# Patient Record
Sex: FEMALE | Race: WHITE | URBAN - METROPOLITAN AREA
[De-identification: names, ages, dates, MRNs, and addresses within clinical notes are randomized per-mention and may not be internally consistent; named-entity substitution may affect disease eponyms.]

---
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
## 2025-06-16 ENCOUNTER — OFFICE VISIT (OUTPATIENT)
Age: 25
End: 2025-06-16
Payer: COMMERCIAL

## 2025-06-16 VITALS
HEIGHT: 64 IN | OXYGEN SATURATION: 98 % | WEIGHT: 142.8 LBS | TEMPERATURE: 97.7 F | RESPIRATION RATE: 14 BRPM | HEART RATE: 67 BPM | DIASTOLIC BLOOD PRESSURE: 70 MMHG | BODY MASS INDEX: 24.38 KG/M2 | SYSTOLIC BLOOD PRESSURE: 111 MMHG

## 2025-06-16 DIAGNOSIS — Z13.29 SCREENING FOR THYROID DISORDER: ICD-10-CM

## 2025-06-16 DIAGNOSIS — D50.8 OTHER IRON DEFICIENCY ANEMIA: ICD-10-CM

## 2025-06-16 DIAGNOSIS — I73.00 RAYNAUD'S PHENOMENON WITHOUT GANGRENE: ICD-10-CM

## 2025-06-16 DIAGNOSIS — Z13.1 SCREENING FOR DIABETES MELLITUS: ICD-10-CM

## 2025-06-16 DIAGNOSIS — Z00.00 ENCOUNTER FOR GENERAL ADULT MEDICAL EXAMINATION WITHOUT ABNORMAL FINDINGS: Primary | ICD-10-CM

## 2025-06-16 DIAGNOSIS — Z76.89 ENCOUNTER TO ESTABLISH CARE: ICD-10-CM

## 2025-06-16 DIAGNOSIS — E55.9 VITAMIN D DEFICIENCY: ICD-10-CM

## 2025-06-16 DIAGNOSIS — Z13.220 SCREENING CHOLESTEROL LEVEL: ICD-10-CM

## 2025-06-16 PROBLEM — G43.909 MIGRAINE: Status: ACTIVE | Noted: 2025-06-16

## 2025-06-16 PROCEDURE — 99385 PREV VISIT NEW AGE 18-39: CPT

## 2025-06-16 RX ORDER — ATOGEPANT 60 MG/1
TABLET ORAL
COMMUNITY
Start: 2024-07-08

## 2025-06-16 RX ORDER — NORETHINDRONE ACETATE AND ETHINYL ESTRADIOL 1MG-20(21)
1 KIT ORAL DAILY
COMMUNITY

## 2025-06-16 RX ORDER — RIMEGEPANT SULFATE 75 MG/75MG
TABLET, ORALLY DISINTEGRATING ORAL
COMMUNITY

## 2025-06-16 SDOH — ECONOMIC STABILITY: FOOD INSECURITY: WITHIN THE PAST 12 MONTHS, YOU WORRIED THAT YOUR FOOD WOULD RUN OUT BEFORE YOU GOT MONEY TO BUY MORE.: NEVER TRUE

## 2025-06-16 SDOH — ECONOMIC STABILITY: FOOD INSECURITY: WITHIN THE PAST 12 MONTHS, THE FOOD YOU BOUGHT JUST DIDN'T LAST AND YOU DIDN'T HAVE MONEY TO GET MORE.: NEVER TRUE

## 2025-06-16 ASSESSMENT — PATIENT HEALTH QUESTIONNAIRE - PHQ9
SUM OF ALL RESPONSES TO PHQ QUESTIONS 1-9: 0
1. LITTLE INTEREST OR PLEASURE IN DOING THINGS: NOT AT ALL
SUM OF ALL RESPONSES TO PHQ QUESTIONS 1-9: 0
2. FEELING DOWN, DEPRESSED OR HOPELESS: NOT AT ALL

## 2025-06-16 NOTE — PROGRESS NOTES
CC:  Chief Complaint   Patient presents with    New Patient     Establish care       HPI:  Corinna Mayorga is a 24 y.o. year old female who presents to the clinic as a new patient to establish care and for annual wellness visit.    She has ongoing chronic migraines, currently on Qulipta for preventative and Nurtec for abortive therapy. Followed by neurology.     No acute concerns.  She does report symptoms correlating with Raynaud's phenomenon.  Reports when it is cold, her feet will easily get cold with decreased color and sensation of pins-and-needles.  She mentioned this to her neurologist, who recommended she follow-up with primary care for further management of this.  Also mentions starting calcium channel blocker to manage her symptoms if needed.    Health Maintenance:  - pap, [facility]    Reviewed PmHx, RxHx, FmHx, SocHx, AllgHx and updated in chart.    ROS:  General:  Denies weight changes, fever, headache  Opthalmologic:  Denies blurred vision, changes in vision  ENT:  Denies difficulty swallowing, decreased hearing  Respiratory:  Denies shortness of breath, cough, coughing up blood, wheezing  Cardiovascular:  Denies chest pain, edema  Gastrointestinal:  Denies abdominal pain, blood in stool, changes in bowel habits, heartburn, nausea and vomiting  Genitourinary:  Denies difficulty urinating, painful urinating, blood in urine  Musculoskeletal:  Denies joint stiffness, muscle aches  Skin:  Denies rash, lesions. Admits easy bruising, discoloration  Neurological:  Denies difficulty speaking, dizziness, fainting  Psychiatric:  Denies anxiety, depression, suicidal thoughts, mood changes         Vitals:    06/16/25 1320   BP: 111/70   BP Site: Left Upper Arm   Patient Position: Sitting   Pulse: 67   Resp: 14   Temp: 97.7 °F (36.5 °C)   SpO2: 98%   Weight: 64.8 kg (142 lb 12.8 oz)   Height: 1.626 m (5' 4\")     PHYSICAL EXAM:  General:  Alert and oriented x 3. No acute distress  Head:  
Corinna Mayorga is a 24 y.o. female    Chief Complaint   Patient presents with    New Patient     Establish care     Vitals:    06/16/25 1320   BP: 111/70   BP Site: Left Upper Arm   Patient Position: Sitting   Pulse: 67   Resp: 14   Temp: 97.7 °F (36.5 °C)   SpO2: 98%   Weight: 64.8 kg (142 lb 12.8 oz)   Height: 1.626 m (5' 4\")         Health Maintenance Due   Topic Date Due    Depression Screen  Never done    Varicella vaccine (1 of 2 - 13+ 2-dose series) Never done    HPV vaccine (1 - 3-dose series) Never done    HIV screen  Never done    Meningococcal B vaccine (1 of 2 - Standard) Never done    Chlamydia/GC screen  Never done    Hepatitis C screen  Never done    Hepatitis B vaccine (1 of 3 - 19+ 3-dose series) Never done    DTaP/Tdap/Td vaccine (1 - Tdap) Never done    Pap smear  Never done    COVID-19 Vaccine (1 - 2024-25 season) Never done         \"Have you been to the ER, urgent care clinic since your last visit?  Hospitalized since your last visit?\"    NO    “Have you seen or consulted any other health care providers outside of [facility] since your last visit?”    NO        “Have you had a pap smear?”    NO    No cervical cancer screening on file          
intact

## 2025-06-17 LAB
25(OH)D3 SERPL-MCNC: 41.3 NG/ML (ref 30–100)
ALBUMIN SERPL-MCNC: 3.9 G/DL (ref 3.5–5)
ALBUMIN/GLOB SERPL: 1 (ref 1.1–2.2)
ALP SERPL-CCNC: 73 U/L (ref 45–117)
ALT SERPL-CCNC: 22 U/L (ref 12–78)
ANION GAP SERPL CALC-SCNC: 6 MMOL/L (ref 2–12)
AST SERPL-CCNC: 21 U/L (ref 15–37)
BASOPHILS # BLD: 0.04 K/UL (ref 0–0.1)
BASOPHILS NFR BLD: 0.6 % (ref 0–1)
BILIRUB SERPL-MCNC: 0.7 MG/DL (ref 0.2–1)
BUN SERPL-MCNC: 12 MG/DL (ref 6–20)
BUN/CREAT SERPL: 14 (ref 12–20)
CALCIUM SERPL-MCNC: 9.3 MG/DL (ref 8.5–10.1)
CHLORIDE SERPL-SCNC: 106 MMOL/L (ref 97–108)
CHOLEST SERPL-MCNC: 196 MG/DL
CO2 SERPL-SCNC: 27 MMOL/L (ref 21–32)
CREAT SERPL-MCNC: 0.88 MG/DL (ref 0.55–1.02)
DIFFERENTIAL METHOD BLD: NORMAL
EOSINOPHIL # BLD: 0.06 K/UL (ref 0–0.4)
EOSINOPHIL NFR BLD: 0.9 % (ref 0–7)
ERYTHROCYTE [DISTWIDTH] IN BLOOD BY AUTOMATED COUNT: 12.6 % (ref 11.5–14.5)
EST. AVERAGE GLUCOSE BLD GHB EST-MCNC: 97 MG/DL
FERRITIN SERPL-MCNC: 17 NG/ML (ref 26–388)
GLOBULIN SER CALC-MCNC: 3.9 G/DL (ref 2–4)
GLUCOSE SERPL-MCNC: 83 MG/DL (ref 65–100)
HBA1C MFR BLD: 5 % (ref 4–5.6)
HCT VFR BLD AUTO: 41 % (ref 35–47)
HDLC SERPL-MCNC: 73 MG/DL
HDLC SERPL: 2.7 (ref 0–5)
HGB BLD-MCNC: 13.2 G/DL (ref 11.5–16)
IMM GRANULOCYTES # BLD AUTO: 0.01 K/UL (ref 0–0.04)
IMM GRANULOCYTES NFR BLD AUTO: 0.1 % (ref 0–0.5)
IRON SATN MFR SERPL: 16 % (ref 20–50)
IRON SERPL-MCNC: 75 UG/DL (ref 35–150)
LDLC SERPL CALC-MCNC: 104.6 MG/DL (ref 0–100)
LYMPHOCYTES # BLD: 1.74 K/UL (ref 0.8–3.5)
LYMPHOCYTES NFR BLD: 25.1 % (ref 12–49)
MCH RBC QN AUTO: 31.3 PG (ref 26–34)
MCHC RBC AUTO-ENTMCNC: 32.2 G/DL (ref 30–36.5)
MCV RBC AUTO: 97.2 FL (ref 80–99)
MONOCYTES # BLD: 0.55 K/UL (ref 0–1)
MONOCYTES NFR BLD: 7.9 % (ref 5–13)
NEUTS SEG # BLD: 4.52 K/UL (ref 1.8–8)
NEUTS SEG NFR BLD: 65.4 % (ref 32–75)
NRBC # BLD: 0 K/UL (ref 0–0.01)
NRBC BLD-RTO: 0 PER 100 WBC
PLATELET # BLD AUTO: 210 K/UL (ref 150–400)
PMV BLD AUTO: 12.4 FL (ref 8.9–12.9)
POTASSIUM SERPL-SCNC: 4.3 MMOL/L (ref 3.5–5.1)
PROT SERPL-MCNC: 7.8 G/DL (ref 6.4–8.2)
RBC # BLD AUTO: 4.22 M/UL (ref 3.8–5.2)
SODIUM SERPL-SCNC: 139 MMOL/L (ref 136–145)
TIBC SERPL-MCNC: 467 UG/DL (ref 250–450)
TRIGL SERPL-MCNC: 92 MG/DL
TSH SERPL DL<=0.05 MIU/L-ACNC: 0.98 UIU/ML (ref 0.36–3.74)
VIT B12 SERPL-MCNC: 191 PG/ML (ref 193–986)
VLDLC SERPL CALC-MCNC: 18.4 MG/DL
WBC # BLD AUTO: 6.9 K/UL (ref 3.6–11)

## 2025-06-18 ENCOUNTER — RESULTS FOLLOW-UP (OUTPATIENT)
Age: 25
End: 2025-06-18

## 2025-09-05 ENCOUNTER — OFFICE VISIT (OUTPATIENT)
Age: 25
End: 2025-09-05

## 2025-09-05 VITALS
SYSTOLIC BLOOD PRESSURE: 114 MMHG | HEART RATE: 88 BPM | DIASTOLIC BLOOD PRESSURE: 77 MMHG | BODY MASS INDEX: 25.4 KG/M2 | OXYGEN SATURATION: 98 % | WEIGHT: 148 LBS

## 2025-09-05 DIAGNOSIS — J01.90 ACUTE NON-RECURRENT SINUSITIS, UNSPECIFIED LOCATION: Primary | ICD-10-CM

## 2025-09-05 RX ORDER — METHYLPREDNISOLONE 4 MG/1
TABLET ORAL
Qty: 21 TABLET | Refills: 0 | Status: SHIPPED | OUTPATIENT
Start: 2025-09-05 | End: 2025-09-11